# Patient Record
Sex: FEMALE | Race: WHITE | NOT HISPANIC OR LATINO | URBAN - METROPOLITAN AREA
[De-identification: names, ages, dates, MRNs, and addresses within clinical notes are randomized per-mention and may not be internally consistent; named-entity substitution may affect disease eponyms.]

---

## 2018-03-12 ENCOUNTER — OFFICE VISIT (OUTPATIENT)
Dept: URGENT CARE | Facility: CLINIC | Age: 58
End: 2018-03-12
Payer: COMMERCIAL

## 2018-03-12 VITALS
HEIGHT: 62 IN | WEIGHT: 150 LBS | BODY MASS INDEX: 27.6 KG/M2 | OXYGEN SATURATION: 98 % | HEART RATE: 98 BPM | SYSTOLIC BLOOD PRESSURE: 121 MMHG | DIASTOLIC BLOOD PRESSURE: 78 MMHG | TEMPERATURE: 98 F | RESPIRATION RATE: 14 BRPM

## 2018-03-12 DIAGNOSIS — R11.2 NAUSEA AND VOMITING IN ADULT: Primary | ICD-10-CM

## 2018-03-12 PROCEDURE — 96372 THER/PROPH/DIAG INJ SC/IM: CPT | Mod: S$GLB,,, | Performed by: EMERGENCY MEDICINE

## 2018-03-12 PROCEDURE — 99203 OFFICE O/P NEW LOW 30 MIN: CPT | Mod: SA,25,S$GLB, | Performed by: NURSE PRACTITIONER

## 2018-03-12 RX ORDER — ESCITALOPRAM OXALATE 10 MG/1
TABLET ORAL
Refills: 11 | COMMUNITY
Start: 2018-02-16

## 2018-03-12 RX ORDER — ROSUVASTATIN CALCIUM 20 MG/1
20 TABLET, COATED ORAL DAILY
Refills: 11 | COMMUNITY
Start: 2018-03-06

## 2018-03-12 RX ORDER — ALPRAZOLAM 0.5 MG/1
0.5 TABLET ORAL 3 TIMES DAILY PRN
Refills: 5 | COMMUNITY
Start: 2018-02-16

## 2018-03-12 RX ORDER — ONDANSETRON 2 MG/ML
4 INJECTION INTRAMUSCULAR; INTRAVENOUS
Status: COMPLETED | OUTPATIENT
Start: 2018-03-12 | End: 2018-03-12

## 2018-03-12 RX ORDER — CYCLOBENZAPRINE HCL 10 MG
10 TABLET ORAL 3 TIMES DAILY
Refills: 11 | COMMUNITY
Start: 2017-12-19

## 2018-03-12 RX ORDER — ONDANSETRON 4 MG/1
4 TABLET, ORALLY DISINTEGRATING ORAL EVERY 6 HOURS PRN
Qty: 25 TABLET | Refills: 0 | Status: SHIPPED | OUTPATIENT
Start: 2018-03-12

## 2018-03-12 RX ADMIN — ONDANSETRON 4 MG: 2 INJECTION INTRAMUSCULAR; INTRAVENOUS at 01:03

## 2018-03-12 NOTE — PROGRESS NOTES
"Subjective:       Patient ID: Teressa Bales is a 57 y.o. female.    Vitals:  height is 5' 2" (1.575 m) and weight is 68 kg (150 lb). Her temperature is 97.5 °F (36.4 °C). Her blood pressure is 121/78 and her pulse is 98. Her respiration is 14 and oxygen saturation is 98%.     Chief Complaint: No chief complaint on file.    Pt is c/o diarrhea and vomitting that started at midnight. Pt thinks its food poisoning and says she hasnt drink alcohol. Pt took antidiarrhea med this morning. Pt denies blood in stool or vomit      Review of Systems   Constitution: Positive for chills and fever.   Cardiovascular: Negative for chest pain.   Respiratory: Negative for shortness of breath.    Skin: Negative for itching.   Musculoskeletal: Negative for back pain.   Gastrointestinal: Positive for diarrhea, nausea and vomiting. Negative for abdominal pain, constipation, hematochezia and melena.   Genitourinary: Negative for dysuria, genital sores, hematuria, missed menses, non-menstrual bleeding and urgency.       Objective:      Physical Exam   Constitutional: She is oriented to person, place, and time. She appears well-developed and well-nourished. She is cooperative.  Non-toxic appearance. She does not appear ill. No distress.   HENT:   Head: Normocephalic and atraumatic.   Right Ear: Hearing, tympanic membrane and ear canal normal.   Left Ear: Hearing, tympanic membrane and ear canal normal.   Nose: No mucosal edema, rhinorrhea or nasal deformity. No epistaxis. Right sinus exhibits no maxillary sinus tenderness and no frontal sinus tenderness. Left sinus exhibits no maxillary sinus tenderness and no frontal sinus tenderness.   Mouth/Throat: Uvula is midline and mucous membranes are normal. No trismus in the jaw. Normal dentition. No uvula swelling. No posterior oropharyngeal erythema.   Eyes: Conjunctivae and lids are normal. Right eye exhibits no discharge. Left eye exhibits no discharge. No scleral icterus.   Sclera clear bilat "   Neck: Trachea normal, normal range of motion, full passive range of motion without pain and phonation normal. Neck supple.   Cardiovascular: Regular rhythm and normal pulses.  Tachycardia present.    Pulmonary/Chest: Effort normal and breath sounds normal. No respiratory distress.   Abdominal: Soft. Normal appearance. She exhibits no distension, no pulsatile midline mass and no mass. Bowel sounds are increased. There is no tenderness.   Musculoskeletal: Normal range of motion. She exhibits no edema or deformity.   Neurological: She is alert and oriented to person, place, and time. She exhibits normal muscle tone. Coordination normal.   Skin: Skin is warm, dry and intact. She is not diaphoretic. No pallor.   Psychiatric: She has a normal mood and affect. Her speech is normal and behavior is normal. Judgment and thought content normal. Cognition and memory are normal.   Nursing note and vitals reviewed.      Assessment:       1. Nausea and vomiting in adult        Plan:       Patient Instructions     Uncertain Causes of Diarrhea (Adult)    Diarrhea is when stools are loose and watery. This can be caused by:  · Viral infections  · Bacterial infections  · Food poisoning  · Parasites  · Irritable bowel syndrome (IBS)  · Inflammatory bowel diseases such as ulcerative colitis, Crohn's disease, and celiac disease  · Food intolerance, such as to lactose, the sugar found in milk and milk products  · Reaction to medicines like antibiotics, laxatives, cancer drugs, and antacids  Along with diarrhea, you may also have:  · Abdominal pain and cramping  · Nausea and vomiting  · Loss of bowel control  · Fever and chills  · Bloody stools  In some cases, antibiotics may help to treat diarrhea. You may have a stool sample test. This is done to see what is causing your diarrhea, and if antibiotics will help treat it. The results of a stool sample test may take up to 2 days. The healthcare provider may not give you antibiotics until he  or she has the stool test results.  Diarrhea can cause dehydration. This is the loss of too much water and other fluids from the body. When this occurs, body fluid must be replaced. This can be done with oral rehydration solutions. Oral rehydration solutions are available at drugstores and grocery stores without a prescription.  Home care  Follow all instructions given by your healthcare provider. Rest at home for the next 24 hours, or until you feel better. Avoid caffeine, tobacco, and alcohol. These can make diarrhea, cramping, and pain worse.  If taking medicines:  · Dont take over-the-counter diarrhea or nausea medicines unless your healthcare provider tells you to.  · You may use acetaminophen or NSAID medicines like ibuprofen or naproxen to reduce pain and fever. Dont use these if you have chronic liver or kidney disease, or ever had a stomach ulcer or gastrointestinal bleeding. Don't use NSAID medicines if you are already taking one for another condition (like arthritis) or are on daily aspirin therapy (such as for heart disease or after a stroke). Talk with your healthcare provider first.  · If antibiotics were prescribed, be sure you take them until they are finished. Dont stop taking them even when you feel better. Antibiotics must be taken as a full course.  To prevent the spread of illness:  · Remember that washing with soap and water and using alcohol-based  is the best way to prevent the spread of infection.  · Clean the toilet after each use.  · Wash your hands before eating.  · Wash your hands before and after preparing food. Keep in mind that people with diarrhea or vomiting should not prepare food for others.  · Wash your hands after using cutting boards, countertops, and knives that have been in contact with raw foods.  · Wash and then peel fruits and vegetables.  · Keep uncooked meats away from cooked and ready-to-eat foods.  · Use a food thermometer when cooking. Cook poultry to at  least 165°F (74°C). Cook ground meat (beef, veal, pork, lamb) to at least 160°F (71°C). Cook fresh beef, veal, lamb, and pork to at least 145°F (63°C).  · Dont eat raw or undercooked eggs (poached or megha side up), poultry, meat, or unpasteurized milk and juices.  Food and drinks  The main goal while treating vomiting or diarrhea is to prevent dehydration. This is done by taking small amounts of liquids often.  · Keep in mind that liquids are more important than food right now.  · Drink only small amounts of liquids at a time.  · Dont force yourself to eat, especially if you are having cramping, vomiting, or diarrhea. Dont eat large amounts at a time, even if you are hungry.  · If you eat, avoid fatty, greasy, spicy, or fried foods.  · Dont eat dairy foods or drink milk if you have diarrhea. These can make diarrhea worse.  During the first 24 hours you can try:  · Oral rehydration solutions. Do not use sports drinks. They have too much sugar and not enough electrolytes.  · Soft drinks without caffeine  · Ginger ale  · Water (plain or flavored)  · Decaf tea or coffee  · Clear broth, consommé, or bouillon  · Gelatin, popsicles, or frozen fruit juice bars  The second 24 hours, if you are feeling better, you can add:  · Hot cereal, plain toast, bread, rolls, or crackers  · Plain noodles, rice, mashed potatoes, chicken noodle soup, or rice soup  · Unsweetened canned fruit (no pineapple)  · Bananas  As you recover:  · Limit fat intake to less than 15 grams per day. Dont eat margarine, butter, oils, mayonnaise, sauces, gravies, fried foods, peanut butter, meat, poultry, or fish.  · Limit fiber. Dont eat raw or cooked vegetables, fresh fruits except bananas, or bran cereals.  · Limit caffeine and chocolate.  · Limit dairy.  · Dont use spices or seasonings except salt.  · Go back to your normal diet over time, as you feel better and your symptoms improve.  · If the symptoms come back, go back to a simple diet or  clear liquids.  Follow-up care  Follow up with your healthcare provider, or as advised. If a stool sample was taken or cultures were done, call the healthcare provider for the results as instructed.  Call 911  Call 911 if you have any of these symptoms:  · Trouble breathing  · Confusion  · Extreme drowsiness or trouble walking  · Loss of consciousness  · Rapid heart rate  · Chest pain  · Stiff neck  · Seizure  When to seek medical advice  Call your healthcare provider right away if any of these occur:  · Abdominal pain that gets worse  · Constant lower right abdominal pain  · Continued vomiting and inability to keep liquids down  · Diarrhea more than 5 times a day  · Blood in vomit or stool  · Dark urine or no urine for 8 hours, dry mouth and tongue, tiredness, weakness, or dizziness  · Drowsiness  · New rash  · You dont get better in 2 to 3 days  · Fever of 100.4°F (38°C) or higher that doesnt get lower with medicine  Date Last Reviewed: 1/3/2016  © 8943-9040 [employer]. [address] [place] [address], [place] [address]. All rights reserved. This information is not intended as a substitute for professional medical care. Always follow your healthcare professional's instructions.            Nausea and vomiting in adult    Other orders  -     ondansetron injection 4 mg; Inject 4 mg into the muscle one time.  -     ondansetron (ZOFRAN-ODT) 4 MG TbDL; Take 1 tablet (4 mg total) by mouth every 6 (six) hours as needed.  Dispense: 25 tablet; Refill: 0

## 2018-03-12 NOTE — PATIENT INSTRUCTIONS
